# Patient Record
Sex: FEMALE | Race: ASIAN | NOT HISPANIC OR LATINO | URBAN - METROPOLITAN AREA
[De-identification: names, ages, dates, MRNs, and addresses within clinical notes are randomized per-mention and may not be internally consistent; named-entity substitution may affect disease eponyms.]

---

## 2017-02-15 ENCOUNTER — OFFICE VISIT - RIVER FALLS (OUTPATIENT)
Dept: FAMILY MEDICINE | Facility: CLINIC | Age: 22
End: 2017-02-15

## 2017-02-15 ASSESSMENT — MIFFLIN-ST. JEOR: SCORE: 1228.97

## 2017-03-17 ENCOUNTER — OFFICE VISIT - RIVER FALLS (OUTPATIENT)
Dept: FAMILY MEDICINE | Facility: CLINIC | Age: 22
End: 2017-03-17

## 2017-03-17 ASSESSMENT — MIFFLIN-ST. JEOR: SCORE: 1240.77

## 2017-09-14 ENCOUNTER — OFFICE VISIT - RIVER FALLS (OUTPATIENT)
Dept: FAMILY MEDICINE | Facility: CLINIC | Age: 22
End: 2017-09-14

## 2017-09-14 ASSESSMENT — MIFFLIN-ST. JEOR: SCORE: 1222.62

## 2017-11-01 ENCOUNTER — OFFICE VISIT - RIVER FALLS (OUTPATIENT)
Dept: FAMILY MEDICINE | Facility: CLINIC | Age: 22
End: 2017-11-01

## 2017-11-01 ASSESSMENT — MIFFLIN-ST. JEOR: SCORE: 1211.5

## 2017-11-07 ENCOUNTER — OFFICE VISIT - RIVER FALLS (OUTPATIENT)
Dept: FAMILY MEDICINE | Facility: CLINIC | Age: 22
End: 2017-11-07

## 2017-11-07 ASSESSMENT — MIFFLIN-ST. JEOR: SCORE: 1236.9

## 2017-11-13 ENCOUNTER — OFFICE VISIT - RIVER FALLS (OUTPATIENT)
Dept: FAMILY MEDICINE | Facility: CLINIC | Age: 22
End: 2017-11-13

## 2017-11-13 ENCOUNTER — COMMUNICATION - RIVER FALLS (OUTPATIENT)
Dept: FAMILY MEDICINE | Facility: CLINIC | Age: 22
End: 2017-11-13

## 2017-11-13 ASSESSMENT — MIFFLIN-ST. JEOR: SCORE: 1213.55

## 2019-05-02 ENCOUNTER — OFFICE VISIT - RIVER FALLS (OUTPATIENT)
Dept: FAMILY MEDICINE | Facility: CLINIC | Age: 24
End: 2019-05-02

## 2019-05-02 ASSESSMENT — MIFFLIN-ST. JEOR: SCORE: 1222.62

## 2022-02-11 VITALS
OXYGEN SATURATION: 99 % | HEIGHT: 63 IN | TEMPERATURE: 97.8 F | BODY MASS INDEX: 19.67 KG/M2 | HEART RATE: 85 BPM | WEIGHT: 111 LBS | DIASTOLIC BLOOD PRESSURE: 60 MMHG | SYSTOLIC BLOOD PRESSURE: 98 MMHG

## 2022-02-11 VITALS
HEIGHT: 63 IN | WEIGHT: 112.4 LBS | BODY MASS INDEX: 19.91 KG/M2 | DIASTOLIC BLOOD PRESSURE: 60 MMHG | TEMPERATURE: 99.3 F | OXYGEN SATURATION: 99 % | SYSTOLIC BLOOD PRESSURE: 110 MMHG | HEART RATE: 106 BPM

## 2022-02-11 VITALS
DIASTOLIC BLOOD PRESSURE: 66 MMHG | WEIGHT: 115 LBS | OXYGEN SATURATION: 99 % | TEMPERATURE: 98.6 F | HEIGHT: 63 IN | SYSTOLIC BLOOD PRESSURE: 106 MMHG | BODY MASS INDEX: 20.38 KG/M2 | HEART RATE: 106 BPM

## 2022-02-12 VITALS
HEIGHT: 64 IN | BODY MASS INDEX: 19.19 KG/M2 | HEART RATE: 84 BPM | HEIGHT: 63 IN | HEIGHT: 63 IN | BODY MASS INDEX: 19.67 KG/M2 | WEIGHT: 109 LBS | BODY MASS INDEX: 19.31 KG/M2 | SYSTOLIC BLOOD PRESSURE: 98 MMHG | DIASTOLIC BLOOD PRESSURE: 60 MMHG | TEMPERATURE: 99 F | DIASTOLIC BLOOD PRESSURE: 58 MMHG | DIASTOLIC BLOOD PRESSURE: 68 MMHG | BODY MASS INDEX: 18.23 KG/M2 | HEART RATE: 90 BPM | SYSTOLIC BLOOD PRESSURE: 100 MMHG | WEIGHT: 111 LBS | HEART RATE: 72 BPM | TEMPERATURE: 97.8 F | DIASTOLIC BLOOD PRESSURE: 76 MMHG | OXYGEN SATURATION: 95 % | HEIGHT: 64 IN | SYSTOLIC BLOOD PRESSURE: 110 MMHG | WEIGHT: 112.4 LBS | TEMPERATURE: 99.2 F | WEIGHT: 106.8 LBS | TEMPERATURE: 98.1 F | SYSTOLIC BLOOD PRESSURE: 118 MMHG | OXYGEN SATURATION: 99 % | HEART RATE: 101 BPM

## 2022-02-16 NOTE — NURSING NOTE
Comprehensive Intake Entered On:  5/2/2019 9:27 AM CDT    Performed On:  5/2/2019 9:22 AM CDT by Molly Murillo MA               Summary   Chief Complaint :   here for possible belly button infection, has her belly button pierced but has had it pierced for about five years, noticed last night belly button was bleeding    Menstrual Status :   Prophylaxis   Weight Measured :   111 lb(Converted to: 111 lb 0 oz, 50.35 kg)    Height Measured :   63 in(Converted to: 5 ft 3 in, 160.02 cm)    Body Mass Index :   19.66 kg/m2   Body Surface Area :   1.49 m2   Systolic Blood Pressure :   98 mmHg   Diastolic Blood Pressure :   60 mmHg   Mean Arterial Pressure :   73 mmHg   Peripheral Pulse Rate :   85 bpm   BP Site :   Right arm   Pulse Site :   Radial artery   Temperature Tympanic :   97.8 DegF(Converted to: 36.6 DegC)  (LOW)    Oxygen Saturation :   99 %   Race :      Languages :   English   Ethnicity :   Not  or    Molly Murillo MA - 5/2/2019 9:22 AM CDT   Health Status   Allergies Verified? :   Yes   Medication History Verified? :   Yes   Immunizations Current :   No   Medical History Verified? :   No   Pre-Visit Planning Status :   N/A   Tobacco Use? :   Never smoker   Molly Murillo MA - 5/2/2019 9:22 AM CDT   Consents   Consent for Immunization Exchange :   Consent Granted   Consent for Immunizations to Providers :   Consent Granted   Molly Murillo MA - 5/2/2019 9:22 AM CDT   Meds / Allergies   (As Of: 5/2/2019 9:27:08 AM CDT)   Allergies (Active)   No Known Medication Allergies  Estimated Onset Date:   Unspecified ; Created By:   Umang Regalado CMA; Reaction Status:   Active ; Category:   Drug ; Substance:   No Known Medication Allergies ; Type:   Allergy ; Updated By:   Umang Regalado CMA; Reviewed Date:   11/13/2017 7:33 PM CST        Medication List   (As Of: 5/2/2019 9:27:08 AM CDT)   Prescription/Discharge Order    codeine-guaifenesin  :   codeine-guaifenesin ;  Status:   Processing ; Ordered As Mnemonic:   Guiatuss AC 10 mg-100 mg/5 mL oral syrup ; Ordering Provider:   Michelle Brandt; Action Display:   Complete ; Catalog Code:   codeine-guaifenesin ; Order Dt/Tm:   5/2/2019 9:25:34 AM          doxycycline  :   doxycycline ; Status:   Processing ; Ordered As Mnemonic:   Doxy-Caps 100 mg oral tablet ; Ordering Provider:   Michelle Brandt; Action Display:   Complete ; Catalog Code:   doxycycline ; Order Dt/Tm:   5/2/2019 9:25:34 AM          fluticasone  :   fluticasone ; Status:   Processing ; Ordered As Mnemonic:   fluticasone CFC free 220 mcg/inh inhalation aerosol ; Ordering Provider:   Michelle Brandt; Action Display:   Complete ; Catalog Code:   fluticasone ; Order Dt/Tm:   5/2/2019 9:25:34 AM          fluticasone  :   fluticasone ; Status:   Processing ; Ordered As Mnemonic:   fluticasone CFC free 110 mcg/inh inhalation aerosol ; Ordering Provider:   Michelle Brandt; Action Display:   Complete ; Catalog Code:   fluticasone ; Order Dt/Tm:   5/2/2019 9:25:34 AM          SUMAtriptan  :   SUMAtriptan ; Status:   Prescribed ; Ordered As Mnemonic:   Imitrex 50 mg oral tablet ; Simple Display Line:   50 mg, 1 tab(s), PO, Once, may repeat dose once in 2 hours, PRN: for migraine headache, 9 tab(s), 1 Refill(s) ; Ordering Provider:   Chris Beal PA-C; Catalog Code:   SUMAtriptan ; Order Dt/Tm:   2/16/2016 11:13:27 AM            Home Meds    levonorgestrel  :   levonorgestrel ; Status:   Documented ; Ordered As Mnemonic:   Kyleena 19.5 mg intrauterine device ; Simple Display Line:   19.5 mg, 1 EA, intrauteral, once, 0 Refill(s) ; Catalog Code:   levonorgestrel ; Order Dt/Tm:   9/14/2017 11:41:42 AM          ibuprofen  :   ibuprofen ; Status:   Documented ; Ordered As Mnemonic:   ibuprofen 200 mg oral tablet ; Simple Display Line:   200 mg, 1 tab(s), po, prn, PRN: as needed for menstrual pain, 0 Refill(s) ; Catalog Code:   ibuprofen ; Order Dt/Tm:   3/17/2017  10:28:54 AM          adapalene topical  :   adapalene topical ; Status:   Documented ; Ordered As Mnemonic:   Differin 0.1% topical gel ; Simple Display Line:   1 kailyn, TOP, Once a day (at bedtime), 15 g, 0 Refill(s) ; Catalog Code:   adapalene topical ; Order Dt/Tm:   9/24/2015 3:23:13 PM

## 2022-02-16 NOTE — PROGRESS NOTES
Chief Complaint    here for possible belly button infection, has her belly button pierced but has had it pierced for about five years, noticed last night belly button was bleeding  History of Present Illness      Chief complaint as above reviewed and confirmed with patient.  Pt presents to the clinic with concerns re: possible naval piercing infection.  Has had piercing for the last 5 years.  In the last 2 days increased redness, swelling, discharge that is purulent and bloody.  No fevers. took piercing out this am.  Physical Exam   Vitals & Measurements    T: 97.8   F (Tympanic)  HR: 85(Peripheral)  BP: 98/60  SpO2: 99%     HT: 63 in  WT: 111 lb  BMI: 19.66       nad appears well, exam of the umbilicus reveals small erythematous nodule at 12:00 with serosanguineous discharge.  TTP.  mild erythema.          Assessment/Plan       1. Local skin infection (L08.9)         keflex as ordered. keep clean and dry. small amount of topical abx.  fu prn       Orders:         cephalexin, = 1 cap(s) ( 500 mg ), PO, QID, x 10 day(s), # 40 cap(s), 0 Refill(s), Type: Acute, Pharmacy: [id number], 1 cap(s) Oral qid,x10 day(s), (Ordered)  Patient Information     Name:ASCENCION CHO      Address:      [address] [address]     Sex:Female     YOB: 1995     [phone]     Emergency Contact:BENI CHO     MRN:[id number]     FIN:[id number]     Location:[facility]     Date of Service:05/02/2019      Primary Care Physician:       NONE ,       Attending Physician:       Yeyo BALDERAS, [place], [phone]  Problem List/Past Medical History    Ongoing     No qualifying data    Historical     No qualifying data  Procedure/Surgical History     Plastic surgery scar revision plus removal of extra finger and toe (10/06/2015)        Medications     Differin 0.1% topical gel: 1 kailyn, TOP, Once a day (at bedtime), 15 g, 0 Refill(s).     Imitrex 50 mg oral tablet: 50  mg, 1 tab(s), PO, Once, may repeat dose once in 2 hours, PRN: for migraine headache, 9 tab(s), 1 Refill(s).     ibuprofen 200 mg oral tablet: 200 mg, 1 tab(s), po, prn, PRN: as needed for menstrual pain, 0 Refill(s).     Kyleena 19.5 mg intrauterine device: 19.5 mg, 1 EA, intrauteral, once, 0 Refill(s).     Keflex 500 mg oral capsule: 500 mg, 1 cap(s), PO, QID, for 10 day(s), 40 cap(s), 0 Refill(s).      Allergies    No Known Medication Allergies  Social History    Smoking Status - 05/02/2019     Never smoker     Alcohol - Denies Alcohol Use, 09/28/2015      Never, 09/24/2015     Employment and Education      Student, 09/24/2015     Exercise and Physical Activity      Exercise frequency: 2-3 x per wk.. Exercise type: Running, Walking., 10/25/2016     Sexual      Sexually active: Yes. Sexual orientation: Heterosexual. Contraceptive Use Details: Birth control pill., 09/24/2015     Substance Abuse - Denies Substance Abuse, 09/28/2015      Never, 09/24/2015     Tobacco - Denies Tobacco Use, 09/28/2015      Never, 09/24/2015  Immunizations      Vaccine Date Status      meningococcal conjugate vaccine 08/09/2013 Recorded      human papillomavirus vaccine 01/25/2012 Recorded      influenza virus vaccine, inactivated 10/27/2011 Recorded      human papillomavirus vaccine 09/21/2011 Recorded      meningococcal conjugate vaccine 07/06/2011 Recorded      human papillomavirus vaccine 07/06/2011 Recorded      varicella 05/07/2008 Recorded      tetanus/diphth/pertuss (Tdap) adult/adol 05/07/2008 Recorded      varicella 11/26/2003 Recorded      DTaP 10/10/2002 Recorded      MMR (measles/mumps/rubella) 10/10/2002 Recorded      IPV 10/10/2002 Recorded      DTaP 01/04/1999 Recorded      Hib (HbOC) 12/27/1998 Recorded      MMR (measles/mumps/rubella) 09/14/1998 Recorded      hepatitis B pediatric vaccine 1997 Recorded      DTaP 1997 Recorded      IPV 1997 Recorded      DTaP 1997 Recorded      IPV 1997  Recorded      IPV 1997 Recorded      hepatitis B pediatric vaccine 1997 Recorded      IPV 1997 Recorded      DTaP 1997 Recorded      hepatitis B pediatric vaccine 1997 Recorded

## 2022-02-16 NOTE — PROGRESS NOTES
Patient:   ASCENCION CHO            MRN: [id number]            FIN: [id number]               Age:   22 years     Sex:  Female     :  1995   Associated Diagnoses:   Right acute otitis media   Author:   Michelle Brandt      Visit Information      Date of Service: 2017 08:34 am  Performing Location: [facility]  Encounter#: [id number]      Primary Care Provider (PCP):  NONE ,       Referring Provider:  Michelle Brandt    NPI# [id number]      Chief Complaint   2017 8:43 AM CDT    Here with c/o sore throat for few weeks but now running fevers and soreness has worsened over past few days.  took ibuprofen this a.m.      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    Ear/Nose/Mouth/Throat:  No ear pain, No nasal congestion, No sore throat.    Respiratory:  No shortness of breath, No cough, No wheezing.    Gastrointestinal:  No nausea, No vomiting, No diarrhea.              Health Status   Allergies:    Allergic Reactions (Selected)  No Known Medication Allergies   Medications:  (Selected)   Prescriptions  Prescribed  Amoxil 875 mg oral tablet: 1 tab(s) ( 875 mg ), PO, BID, # 20 tab(s), 0 Refill(s), Type: Maintenance, Pharmacy: [id number], 1 tab(s) po bid,x10 day(s)  Imitrex 50 mg oral tablet: 1 tab(s) ( 50 mg ), PO, Once, Instructions: may repeat dose once in 2 hours, PRN: for migraine headache, # 9 tab(s), 1 Refill(s), Type: Soft Stop, Pharmacy: [facility], 1 tab(s) po once,PRN:for migraine headache,Instr:may repeat dose once in 2 hours...  Documented Medications  Documented  Differin 0.1% topical gel: 1 kailyn, TOP, Once a day  (at bedtime), # 15 g, 0 Refill(s), Type: Maintenance  Kyleena 19.5 mg intrauterine device: 1 EA ( 19.5 mg ), intrauteral, once, 0 Refill(s), Type: Maintenance  ibuprofen 200 mg oral tablet: 1 tab(s) ( 200 mg ), po, prn, PRN: as needed for menstrual pain, 0 Refill(s), Type: Maintenance   Problem list:    No problem items selected or recorded.      Histories   Past Medical History:    No active or resolved past medical history items have been selected or recorded.   Family History:    No family history items have been selected or recorded.   Procedure history:    Plastic surgery scar revision plus removal of extra finger and toe (SNOMED CT 2891649932) on 10/6/2015 at 20 Years.   Social History:        Alcohol Assessment: Denies Alcohol Use            Never      Tobacco Assessment: Denies Tobacco Use            Never      Substance Abuse Assessment: Denies Substance Abuse            Never      Employment and Education Assessment            Student      Exercise and Physical Activity Assessment            Exercise frequency: 2-3 x per wk..  Exercise type: Running, Walking.      Sexual Assessment            Sexually active: Yes.  Sexual orientation: Heterosexual.  Contraceptive Use Details: Birth control pill.        Physical Examination   Vital Signs   11/1/2017 8:43 AM CDT Temperature Temporal 98.1 DegF    Peripheral Pulse Rate 84 bpm    Systolic Blood Pressure 100 mmHg    Diastolic Blood Pressure 76 mmHg    Mean Arterial Pressure 84 mmHg      Measurements from flowsheet : Measurements   11/1/2017 8:43 AM CDT Height Measured - Standard 63.5 in    Weight Measured - Standard 106.8 lb    BSA 1.47 m2    Body Mass Index 18.62 kg/m2      General:  Alert and oriented, No acute distress.    Eye:  Normal conjunctiva.    HENT:  Normal hearing, Oral mucosa is moist, No sinus tenderness, mildly injected post oropharynx with right TM injected and signifcantly retracted.    Neck:  Supple, Non-tender, No lymphadenopathy.     Respiratory:  Lungs are clear to auscultation, Respirations are non-labored, Breath sounds are equal, Symmetrical chest wall expansion.    Cardiovascular:  Normal rate, Regular rhythm, No murmur.    Gastrointestinal:  Soft, Non-tender.    Musculoskeletal:  Normal range of motion, Normal gait.    Integumentary:  Warm, Dry, Pink, No rash.    Neurologic:  Alert, Oriented.    Psychiatric:  Cooperative.       Impression and Plan   Diagnosis     Right acute otitis media (WCD85-GQ H66.91).     Patient Instructions:       Counseled: Patient, Regarding diagnosis, Regarding treatment, Regarding medications, Verbalized understanding, Counseled on symptomatic management. Return to clinic for re evaluation if worsening, simply not improving, or failure to resolve.   .    Orders     Orders (Selected)   Prescriptions  Prescribed  Amoxil 875 mg oral tablet: 1 tab(s) ( 875 mg ), PO, BID, # 20 tab(s), 0 Refill(s), Type: Maintenance, Pharmacy: [id number], 1 tab(s) po bid,x10 day(s).

## 2022-02-16 NOTE — PROGRESS NOTES
Patient:   ASCENCION CHO            MRN: [id number]            FIN: [id number]               Age:   21 years     Sex:  Female     :  1995   Associated Diagnoses:   None   Author:   Jayro Olivera MD      Visit Information      Date of Service: 02/15/2017 04:32 pm  Performing Location: [facility]  Encounter#: [id number]      Primary Care Provider (PCP):  Not recorded.      Referring Provider:  No referring provider recorded for selected visit.      Chief Complaint   2/15/2017 4:52 PM CST    c/o cough, fever, plugged ears, nasal congestion for the past 5 weeks not getting  better        History of Present Illness   CC as above and reviewed w  patient   She has had symptoms for 5 weeks was initially improving but now worsening. She has some sinus pressure and tenderness. In the past when she's had epsidoes like this she's been treated with steroids with relief.       Review of Systems         Gen - having subjective fevers  Resp - no difficulty breathing      Health Status   Allergies:    Allergic Reactions (Selected)  No known allergies   Medications:  (Selected)   Prescriptions  Prescribed  Imitrex 50 mg oral tablet: 1 tab(s) ( 50 mg ), PO, Once, Instructions: may repeat dose once in 2 hours, PRN: for migraine headache, # 9 tab(s), 1 Refill(s), Type: Soft Stop, Pharmacy: [facility], 1 tab(s) po once,PRN:for migraine headache,Instr:may repeat dose once in 2 hours...  Robitussin-AC 10 mg-100 mg/5 mL oral syrup: 5 mL, po, qhs, PRN: as needed for cough, # 120 mL, 0 Refill(s), Type: Maintenance  Zithromax Z-Alok 250 mg oral tablet: Take 2 tablets on Day 1 and then 1 tablet, PO, Daily, Instructions: until finished, # 6 tab(s), 0 Refill(s), Type: Maintenance, Pharmacy: [id number], Take 2 tablets on Day 1 and then 1 tablet po daily,Instr:until finished  predniSONE 20 mg oral tablet: 1 tab(s) ( 20 mg ), po, daily, # 5 tab(s), 0 Refill(s), Type: Maintenance, Pharmacy: [id number], 1 tab(s) po daily,x5 day(s)  Documented Medications  Documented  Differin 0.1% topical gel: 1 kailyn, TOP, Once a day (at bedtime), # 15 g, 0 Refill(s), Type: Maintenance  Levonest-28: 1 tab(s), po, daily, 0 Refill(s), Type: Maintenance  gabapentin 300 mg oral capsule: 1 cap(s) ( 300 mg ), po, bid, 0 Refill(s), Type: Maintenance      Histories   Past Medical History:    No active or resolved past medical history items have been selected or recorded.   Family History:    No family history items have been selected or recorded.   Procedure history:    Plastic surgery scar revision plus removal of extra finger and toe ([id number]) on 10/6/2015 at 20 Years.   Social History:        Alcohol Assessment: Denies Alcohol Use            Never      Tobacco Assessment: Denies Tobacco Use            Never      Substance Abuse Assessment: Denies Substance Abuse            Never      Employment and Education Assessment            Student      Exercise and Physical Activity Assessment            Exercise frequency: 2-3 x per wk..  Exercise type: Running, Walking.      Sexual Assessment            Sexually active: Yes.  Sexual orientation: Heterosexual.  Contraceptive Use Details: Birth control pill.        Physical Examination   Vital Signs   2/15/2017 4:52 PM CST Temperature Tympanic 99.3 DegF    Peripheral Pulse Rate 106 bpm  HI    Pulse Site Radial artery    HR Method Manual    Systolic Blood Pressure 110 mmHg    Diastolic Blood Pressure 60 mmHg    Mean Arterial Pressure 77 mmHg    BP Site Right arm    BP Method Manual    Oxygen Saturation 99 %      Measurements from flowsheet : Measurements   2/15/2017 4:52 PM CST Height Measured - Standard 63 in    Weight Measured - Standard 112.4 lb    BSA 1.5 m2    Body Mass Index 19.91 kg/m2    Body Mass Index Percentile 26.60      Gen - appears well  ENT - masxillary sinus tenderness b/l. Nose is inflamed b/l  Neck - no adenopathy  CV: RRR w/o MRG. Normal S1 and S2   Resp: Clear  to auscultation b/l. Normal breath sounds without wheezes or crackles. No increased work of breathing.       Impression and Plan   sinusitis  - z-shady  - prednisone 20 mg daily x 5 d  - robitussin ac prn qhs

## 2022-02-16 NOTE — PROGRESS NOTES
Patient:   ASCENCION CHO            MRN: [id number]            FIN: [id number]               Age:   22 years     Sex:  Female     :  1995   Associated Diagnoses:   Dry cough; Laryngitis   Author:   Michelle Brandt      Visit Information      Date of Service: 2017 07:20 pm  Performing Location: [facility]  Encounter#: [id number]      Primary Care Provider (PCP):  NONE ,       Referring Provider:  Michelle Brandt    NPI# [id number]      Chief Complaint   2017 7:33 PM CST   Patient presents for ear infection-pressure, fever 101 had tylenol 2 hours ago, dry deep cough, sweats and chills, and loss of voice x 2 weeks getting worse      History of Present Illness   reviewed presenting problem as above with patient  Seen  with amox then augmentin for infection in throat and ear  Seen  with additional of prednisone, albuterol and cough syrup for persistent cough  She improved  significantly over the weekend but over last 2 days is again coughing uncontrollable and missed a test in her class  Fever remains around 99 or 100  throat is sore from coughing and still causes laryngitis      Health Status   Allergies:    Allergic Reactions (Selected)  No Known Medication Allergies   Medications:  (Selected)   Prescriptions  Prescribed  Augmentin 875 mg oral tablet: 1 tab(s), PO, q12hr, # 20 tab(s), 0 Refill(s), Type: Maintenance, [id number], Do not fill right away-- pt will  on 17 if still having sxs., 1 tab(s) po q12 hrs,x10 day(s)  Doxy-Caps 100 mg oral tablet: 1 tab(s) ( 100 mg ), po, bid, # 20 tab(s), 0 Refill(s), Type: Maintenance, Pharmacy: [id number], 1 tab(s) po bid,x10 day(s)  Guiatuss AC 10 mg-100 mg/5 mL oral syrup: 10 mL, PO, qhs, PRN: for cough, # 120 mL, 0 Refill(s), Type: Maintenance, Pharmacy: [id number], 10 mL po qhs,PRN:for cough  Imitrex 50 mg oral tablet: 1 tab(s) ( 50 mg ), PO, Once, Instructions: may  repeat dose once in 2 hours, PRN: for migraine headache, # 9 tab(s), 1 Refill(s), Type: Soft Stop, Pharmacy: [facility], 1 tab(s) po once,PRN:for migraine headache,Instr:may repeat dose once in 2 hours...  fluticasone CFC free 110 mcg/inh inhalation aerosol: 2 puff(s), inh, bid, Instructions: dispense with space  rinse mouth and throat after use, # 1 EA, 0 Refill(s), Type: Maintenance, Pharmacy: [facility], 2 puff(s) inh bid,x30 day(s),Instr:dispense with space; rinse mouth and throat afte...  fluticasone CFC free 220 mcg/inh inhalation aerosol: 3 puff(s), inh, bid, Instructions: use with spacer chamber  rinse mouth and throat after use, # 1 EA, 0 Refill(s), Type: Maintenance, Pharmacy: [id number], 3 puff(s) inh bid,Instr:use with spacer chamber; rinse mouth and throat after use  predniSONE 10 mg oral tablet: See Instructions, Instructions: 2 tabs each morning and at 3pm for cough , take with food, # 16 tab(s), 0 Refill(s), Type: Maintenance, Pharmacy: [facility], 2 tabs each morning and at 3pm for cough , take with food  Documented Medications  Documented  Differin 0.1% topical gel: 1 kailyn, TOP, Once a day (at bedtime), # 15 g, 0 Refill(s), Type: Maintenance  Kyleena 19.5 mg intrauterine device: 1 EA ( 19.5 mg ), intrauteral, once, 0 Refill(s), Type: Maintenance  ibuprofen 200 mg oral tablet: 1 tab(s) ( 200 mg ), po, prn, PRN: as needed for menstrual pain, 0 Refill(s), Type: Maintenance   Problem list:    No problem items selected or recorded.      Histories   Past Medical History:    No active or resolved past medical history items have been selected or recorded.   Family History:    No family history items have been selected or recorded.   Procedure history:    Plastic surgery scar revision plus removal of extra finger and toe (SNOMED CT [id number]) on 10/6/2015 at 20 Years.   Social History:        Alcohol Assessment: Denies Alcohol Use            Never      Tobacco  Assessment: Denies Tobacco Use            Never      Substance Abuse Assessment: Denies Substance Abuse            Never      Employment and Education Assessment            Student      Exercise and Physical Activity Assessment            Exercise frequency: 2-3 x per wk..  Exercise type: Running, Walking.      Sexual Assessment            Sexually active: Yes.  Sexual orientation: Heterosexual.  Contraceptive Use Details: Birth control pill.        Physical Examination   Vital Signs   11/13/2017 7:33 PM CST Temperature Tympanic 99.2 DegF    Peripheral Pulse Rate 72 bpm    Pulse Site Radial artery    HR Method Manual    Systolic Blood Pressure 110 mmHg    Diastolic Blood Pressure 60 mmHg    Mean Arterial Pressure 77 mmHg    BP Site Right arm    BP Method Manual    Oxygen Saturation 95 %      Measurements from flowsheet : Measurements   11/13/2017 7:33 PM CST Height Measured - Standard 63 in    Weight Measured - Standard 109 lb    BSA 1.48 m2    Body Mass Index 19.31 kg/m2      General:  Alert and oriented, No acute distress.    Eye:  Normal conjunctiva.    HENT:  Tympanic membranes are clear, Normal hearing, Oral mucosa is moist, No sinus tenderness, mild erythema in his post oropahrynx.    Neck:  Supple, Non-tender, No lymphadenopathy.    Respiratory:  Lungs are clear to auscultation, Respirations are non-labored, Breath sounds are equal, Symmetrical chest wall expansion, but decreased all fields and severe cough with exam.    Cardiovascular:  Regular rhythm, No murmur, tachycardic.    Musculoskeletal:  Normal range of motion, Normal gait.    Integumentary:  Warm, Dry, Pink, No rash.    Neurologic:  Alert, Oriented.    Psychiatric:  Cooperative.       Review / Management   Results review:  Lab results: 11/13/2017 8:17 PM CST   Heterophile Ab            Negative  .    Radiology results   X-ray, X-ray reviewed with patient, no abnormality noted.  Will await radiology over-read and if differs such that treatment would  be altered,  will notify patient.       Impression and Plan   Diagnosis     Dry cough ([id number] R05).     Laryngitis (CVI45-UB J04.0).     Patient Instructions:       Counseled: Patient, Regarding diagnosis, Regarding treatment, Regarding medications, Verbalized understanding, will cover for atypicals  steroid inhaler   repeat cough syrup  rtc 7-10 days f/u, sooner if worsening.

## 2022-02-16 NOTE — PROGRESS NOTES
Chief Complaint        Right side stomach pain that started 2 days ago. Having regular BM. Has IUD.      History of Present Illness          Woke up with a headache two days ago. Went to work in the afternoon and noticed pain in right side of abdomen. Hurt while having a BM. Pain is persistent past couple days. BM no longer associated with increased pain. Having normal soft BM. No history of kidney stone in past. IUD placed July 2017 and no problems. Had it checked in follow up and found to be in place. Bleeding is decreasing. Currently in a relationship same partner for 3years (only partner). STD testing negative in past.      Review of Systems          No fevers or vomiting. No diarrhea. No blood in stool. Denies vaginal symptoms.           Works at [employer]      Physical Exam       Vitals & Measurements        T: 99.0(Tympanic)  HR: 90(Peripheral)  BP: 98/58         HT: 63 in  WT: 111.0 lb  BMI: 19.66           General: No acute distress.          HENT: Tympanic membranes are clear, No pharyngeal erythema.          Neck: No lymphadenopathy.          Respiratory: Lungs are clear to auscultation.          Cardiovascular: Normal rate, Regular rhythm.          Musculoskeletal: Normal gait.           Gastrointestinal: Soft, nondistended.  There is some tenderness in the right lower quadrant.           Gynecologic: Internal and external genitalia are normal.  Normal vaginal and cervical mucosa.  Strings are present.  No uterus or ovarian masses.  No cervical motion tenderness      Assessment/Plan           Abdominal pain             No significant findings on CT abdomen.  No signs of appendicitis or ruptured ovarian cyst.  For now will monitor the pain with ibuprofen and Tylenol follow-up if not improving.             Ordered:              CT Abdomen and Pelvis w/contrast (Request), Instructions: IV & ORAL CONTRAST, Abdominal pain              POC HCG, URINE* ([facility]), Specimen Type: Urine, Collection Date:  09/14/17 11:53:00 CDT                   Problem List/Past Medical History        Ongoing         No qualifying data        Historical      Procedure/Surgical History         Plastic surgery scar revision plus removal of extra finger and toe (10/06/2015)      Medications        Differin 0.1% topical gel, 1 kailyn, top, hs        gabapentin 300 mg oral capsule, 300 mg= 1 cap(s), po, bid        ibuprofen 200 mg oral tablet, 200 mg= 1 tab(s), po, prn, PRN        Imitrex 50 mg oral tablet, 50 mg= 1 tab(s), po, once, PRN, 1 refills        Kyleena 19.5 mg intrauterine device, 19.5 mg= 1 EA, intrauteral, once      Allergies        No Known Medication Allergies      Diagnostic Results       UPT: Negative  Images   CT abdomen: Good position.  Normal appendix.  No abnormal findings

## 2022-02-16 NOTE — PROGRESS NOTES
Patient:   ASCENCION CHO            MRN: [id number]            FIN: [id number]               Age:   21 years     Sex:  Female     :  1995   Associated Diagnoses:   Dysmenorrhea, unspecified   Author:   Hunter Brown MD      Visit Information      Date of Service: 2017 10:21 am  Performing Location: [facility]  Encounter#: [id number]      Primary Care Provider (PCP):  Not recorded.      Referring Provider:  No referring provider recorded for selected visit.      Chief Complaint   3/17/2017 10:23 AM CDT   Pt presents today to address her irregular menses. She has been on BC pill and takes the pill daily to avoid getting her period. She did expereincing some spotting. Has been on BC for 2 years. She got her period 3/11/17 and is still present. Lots of pain        Interval History   The patient is a 21-year-old female in today for followup of dysmenorrhea.  I saw her last fall and placed her on continuous OCP to suppress menses because of severe dysmenorrhea.  She had previously had laparoscopy to rule out endometriosis and pelvis was normal.  She had amenorrhea on the continuous pill up until Saturday when she began having a lot of cramps and then a full blown period.  It is tapering off currently and she is wearing a tampon.  The pain is improving.  She has used Tylenol for the pain with some relief.        Review of Systems   Review  of systems is negative except as documented under interval history.      Health Status   Allergies:    Allergic Reactions (Selected)  No Known Medication Allergies   Medications:  (Selected)   Prescriptions  Prescribed  Imitrex 50 mg oral tablet: 1 tab(s) ( 50 mg ), PO, Once, Instructions: may repeat dose once in 2 hours, PRN: for migraine headache, # 9 tab(s), 1 Refill(s), Type: Soft Stop, Pharmacy: [facility], 1 tab(s) po once,PRN:for migraine headache,Instr:may repeat dose once in 2 hours...  Documented Medications  Documented  Differin 0.1% topical  gel: 1 kailyn, TOP, Once a day (at bedtime), # 15 g, 0 Refill(s), Type: Maintenance  Levonest-28: 1 tab(s), po, daily, 0 Refill(s), Type: Maintenance  gabapentin 300 mg oral capsule: 1 cap(s) ( 300 mg ), po, bid, 0 Refill(s), Type: Maintenance  ibuprofen 200 mg oral tablet: 1 tab(s) ( 200 mg ), po, prn, PRN: as needed for menstrual pain, 0 Refill(s), Type: Maintenance   Problem list:    No problem items selected or recorded.      Histories   Past Medical History:    No active or resolved past medical history items have been selected or recorded.   Family History:    No family history items have been selected or recorded.   Procedure history:    Plastic surgery scar revision plus removal of extra finger and toe ([id number]) on 10/6/2015 at 20 Years.   Social History:        Alcohol Assessment: Denies Alcohol Use            Never      Tobacco Assessment: Denies Tobacco Use            Never      Substance Abuse Assessment: Denies Substance Abuse            Never      Employment and Education Assessment            Student      Exercise and Physical Activity Assessment            Exercise frequency: 2-3 x per wk..  Exercise type: Running, Walking.      Sexual Assessment            Sexually active: Yes.  Sexual orientation: Heterosexual.  Contraceptive Use Details: Birth control pill.        Physical Examination   Vital Signs   3/17/2017 10:23 AM CDT Temperature Tympanic 98.6 DegF    Peripheral Pulse Rate 106 bpm  HI    HR Method Electronic    Systolic Blood Pressure 106 mmHg    Diastolic Blood Pressure 66 mmHg    Mean Arterial Pressure 79 mmHg    BP Site Right arm    BP Method Manual    Oxygen Saturation 99 %      General:  The patient is in no obvious distress currently..    Gynecologic:  She is examined with pelvic ultrasound..       Review / Management   Radiology results   Ultrasound, Vaginal probe pelvic ultrasound is done.  The uterus is normal in size measuring 7 x 3 x 4 cm.  Endometrium is thin with no intrauterine  abnormalities other than a small amount of blood within the cavity.  No fibroids are seen.  The ovaries are small bilaterally.  There is no fluid in the cul-de-sac.      Impression and Plan   Diagnosis     Dysmenorrhea, unspecified ([id number] N94.6).     Heavier breakthrough bleeding on continuous OCP with dysmenorrhea.  No obvious pathology..     Plan:  The patient was reassured.  She declines STD testing because she has been tested negatively already and she has the same partner that she has had for the last two and a half years.  She will return p.r.n. and continue with OCP..    Patient Instructions:       Counseled: Patient, Regarding diagnosis, Regarding treatment, Regarding medications, Verbalized understanding.

## 2022-02-16 NOTE — PROGRESS NOTES
Patient:   ASCENCION CHO            MRN: [id number]            FIN: [id number]               Age:   22 years     Sex:  Female     :  1995   Associated Diagnoses:   Cough   Author:   Michelle Brandt      Visit Information      Date of Service: 2017 11:20 am  Performing Location: [facility]  Encounter#: [id number]      Primary Care Provider (PCP):  NONE ,       Referring Provider:  Michelle Brandt    NPI# [id number]      Chief Complaint   2017 11:27 AM CST   f/u cough-- not getting any better and c/o lack of sleep        History of Present Illness   reviewed presenting problem as above with patient  seen last week for ear infection, has since developed barking cough  keeping her and her roomates awake  No nasal drainage, ear and throat feel better  still some laryngitis  taking augmentin      Review of Systems   Constitutional:  No fever, No chills.    Ear/Nose/Mouth/Throat   Respiratory:  No shortness of breath, No wheezing.    Gastrointestinal:  No nausea, No vomiting, No diarrhea.              Health Status   Allergies:    Allergic Reactions (Selected)  No Known Medication Allergies   Medications:  (Selected)   Prescriptions  Prescribed  Augmentin 875 mg oral tablet: 1 tab(s), PO, q12hr, # 20 tab(s), 0 Refill(s), Type: Maintenance, Pharmacy: [id number], Do not fill right away-- pt will  on 17 if still having sxs., 1 tab(s) po q12 hrs,x10 day(s)  Guiatuss AC 10 mg-100 mg/5 mL oral syrup: 10 mL, PO, qhs, PRN: for cough, # 120 mL, 0 Refill(s), Type: Maintenance, Pharmacy: [facility] [id number], 10 mL po qhs,PRN:for cough  Imitrex 50 mg oral tablet: 1 tab(s) ( 50 mg ), PO, Once, Instructions: may repeat dose once in 2 hours, PRN: for migraine headache, # 9 tab(s), 1 Refill(s), Type: Soft Stop, Pharmacy: [facility], 1 tab(s) po once,PRN:for migraine headache,Instr:may repeat dose once in 2 hours...  fluticasone CFC free 110 mcg/inh inhalation  aerosol: 2 puff(s), inh, bid, Instructions: dispense with space  rinse mouth and throat after use, # 1 EA, 0 Refill(s), Type: Maintenance, Pharmacy: [facility] [id number], 2 puff(s) inh bid,x30 day(s),Instr:dispense with space; rinse mouth and throat afte...  predniSONE 10 mg oral tablet: See Instructions, Instructions: 2 tabs each morning and at 3pm for cough , take with food, # 16 tab(s), 0 Refill(s), Type: Maintenance, Pharmacy: [facility] [id number], 2 tabs each morning and at 3pm for cough , take with food  Documented Medications  Documented  Differin 0.1% topical gel: 1 kailyn, TOP, Once a day (at bedtime), # 15 g, 0 Refill(s), Type: Maintenance  Kyleena 19.5 mg intrauterine device: 1 EA ( 19.5 mg ), intrauteral, once, 0 Refill(s), Type: Maintenance  ibuprofen 200 mg oral tablet: 1 tab(s) ( 200 mg ), po, prn, PRN: as needed for menstrual pain, 0 Refill(s), Type: Maintenance   Problem list:    No problem items selected or recorded.      Histories   Past Medical History:    No active or resolved past medical history items have been selected or recorded.   Family History:    No family history items have been selected or recorded.   Procedure history:    Plastic surgery scar revision plus removal of extra finger and toe (SNOMED CT [id number]) on 10/6/2015 at 20 Years.   Social History:        Alcohol Assessment: Denies Alcohol Use            Never      Tobacco Assessment: Denies Tobacco Use            Never      Substance Abuse Assessment: Denies Substance Abuse            Never      Employment and Education Assessment            Student      Exercise and Physical Activity Assessment            Exercise frequency: 2-3 x per wk..  Exercise type: Running, Walking.      Sexual Assessment            Sexually active: Yes.  Sexual orientation: Heterosexual.  Contraceptive Use Details: Birth control pill.        Physical Examination   Vital Signs   11/7/2017 11:27 AM CST Temperature Tympanic 97.8 DegF  LOW     Peripheral Pulse Rate 101 bpm  HI    Pulse Site Radial artery    HR Method Electronic    Systolic Blood Pressure 118 mmHg    Diastolic Blood Pressure 68 mmHg    Mean Arterial Pressure 85 mmHg    BP Site Right arm    BP Method Manual    Oxygen Saturation 99 %      Measurements from flowsheet : Measurements   11/7/2017 11:27 AM CST Height Measured - Standard 63.5 in    Weight Measured - Standard 112.4 lb    BSA 1.51 m2    Body Mass Index 19.6 kg/m2      General:  Alert and oriented, No acute distress.    Eye:  Normal conjunctiva.    HENT:  Tympanic membranes are clear, Normal hearing, Oral mucosa is moist, No pharyngeal erythema, No sinus tenderness.    Neck:  Supple, Non-tender, No lymphadenopathy.    Respiratory:  Lungs are clear to auscultation, Respirations are non-labored, Breath sounds are equal, Symmetrical chest wall expansion.    Cardiovascular:  Normal rate, Regular rhythm, No murmur.    Musculoskeletal:  Normal range of motion, Normal gait.    Integumentary:  Warm, Dry, Pink, No rash.    Neurologic:  Alert, Oriented.    Psychiatric:  Cooperative.       Impression and Plan   Diagnosis     Cough ([id number]).     Patient Instructions:       Counseled: Patient, Regarding diagnosis, Regarding treatment, Regarding medications, Verbalized understanding, Counseled on symptomatic management. Return to clinic for re evaluation if worsening, simply not improving, or failure to resolve.   .    Orders     Orders (Selected)   Prescriptions  Prescribed  Guiatuss AC 10 mg-100 mg/5 mL oral syrup: 10 mL, PO, qhs, PRN: for cough, # 120 mL, 0 Refill(s), Type: Maintenance, Pharmacy: [facility] [id number], 10 mL po qhs,PRN:for cough  fluticasone CFC free 110 mcg/inh inhalation aerosol: 2 puff(s), inh, bid, Instructions: dispense with space  rinse mouth and throat after use, # 1 EA, 0 Refill(s), Type: Maintenance, Pharmacy: [facility] [id number], 2 puff(s) inh bid,x30 day(s),Instr:dispense with space; rinse mouth and  throat afte...  predniSONE 10 mg oral tablet: See Instructions, Instructions: 2 tabs each morning and at 3pm for cough , take with food, # 16 tab(s), 0 Refill(s), Type: Maintenance, Pharmacy: [facility] [id number], 2 tabs each morning and at 3pm for cough , take with food.